# Patient Record
Sex: FEMALE | Race: WHITE | NOT HISPANIC OR LATINO | Employment: OTHER | URBAN - METROPOLITAN AREA
[De-identification: names, ages, dates, MRNs, and addresses within clinical notes are randomized per-mention and may not be internally consistent; named-entity substitution may affect disease eponyms.]

---

## 2024-09-19 ENCOUNTER — LAB VISIT (OUTPATIENT)
Dept: LAB | Facility: HOSPITAL | Age: 29
End: 2024-09-19
Attending: NURSE PRACTITIONER
Payer: COMMERCIAL

## 2024-09-19 DIAGNOSIS — N92.6 IRREGULAR MENSES: ICD-10-CM

## 2024-09-19 PROCEDURE — 36415 COLL VENOUS BLD VENIPUNCTURE: CPT | Performed by: NURSE PRACTITIONER

## 2024-09-19 PROCEDURE — 84144 ASSAY OF PROGESTERONE: CPT | Performed by: NURSE PRACTITIONER

## 2024-09-20 LAB — PROGEST SERPL-MCNC: 10.7 NG/ML

## 2025-01-24 PROBLEM — O03.9 COMPLETE ABORTION: Status: ACTIVE | Noted: 2025-01-24

## 2025-01-24 PROBLEM — Z67.91 RH NEGATIVE STATUS DURING PREGNANCY: Chronic | Status: ACTIVE | Noted: 2025-01-24

## 2025-01-24 PROBLEM — O26.899 RH NEGATIVE STATUS DURING PREGNANCY: Chronic | Status: ACTIVE | Noted: 2025-01-24

## 2025-05-05 ENCOUNTER — OFFICE VISIT (OUTPATIENT)
Dept: INTERNAL MEDICINE | Facility: CLINIC | Age: 30
End: 2025-05-05
Payer: COMMERCIAL

## 2025-05-05 VITALS
HEART RATE: 89 BPM | SYSTOLIC BLOOD PRESSURE: 110 MMHG | WEIGHT: 207.44 LBS | OXYGEN SATURATION: 98 % | HEIGHT: 65 IN | BODY MASS INDEX: 34.56 KG/M2 | DIASTOLIC BLOOD PRESSURE: 74 MMHG | RESPIRATION RATE: 18 BRPM

## 2025-05-05 DIAGNOSIS — Z00.00 HEALTHCARE MAINTENANCE: ICD-10-CM

## 2025-05-05 DIAGNOSIS — Z76.89 ENCOUNTER TO ESTABLISH CARE: Primary | ICD-10-CM

## 2025-05-05 DIAGNOSIS — Z34.92 PREGNANT AND NOT YET DELIVERED IN SECOND TRIMESTER: ICD-10-CM

## 2025-05-05 PROBLEM — O03.9 COMPLETE ABORTION: Status: RESOLVED | Noted: 2025-01-24 | Resolved: 2025-05-05

## 2025-05-05 PROCEDURE — 99999 PR PBB SHADOW E&M-EST. PATIENT-LVL III: CPT | Mod: PBBFAC,,, | Performed by: INTERNAL MEDICINE

## 2025-05-05 NOTE — PROGRESS NOTES
Subjective:       Patient ID: Shannen Barillas is a 29 y.o. female.    Chief Complaint: Establish Care (paperwork)      HPI:    Patient is new to clinic and presents to establish care.     She is 14/6 weeks pregnant. Follows with Dr. Celis for OB care.  Her OB just recommended she establish with a PCP in case she were to have any issues arise.  She has not seen a PCP PE in several years.    She had labs 2024 personally reviewed interpreted by myself.  GFR greater than 60  LFTs normal  LDL normal  A1c normal  HIV screening completed and normal    She is up-to-date on vaccinations.  Discussed getting flu vaccination and Tdap prior to delivery which can be done with her OB.    She did bring prenatal screening for me to review.  Screening revealed she is a carrier for cystic fibrosis, fragile X syndrome, and polycystic kidney disease, autosomal recessive.  She reports her  has already been for genetic screening and is negative for these disease this.  Cystic fibrosis does run on her father side with a paternal cousin  from the disease.  We discussed future implications when her baby is ready to start a family for screening but otherwise no acute issues for this pregnancy.      Past Medical History:   Diagnosis Date    Miscarriage 2025       Family History   Problem Relation Name Age of Onset    Thyroid disease Mother      No Known Problems Father      No Known Problems Sister      No Known Problems Brother      Cystic fibrosis Paternal Cousin         Social History[1]    Review of Systems   Constitutional:  Negative for activity change, fatigue, fever and unexpected weight change.   HENT:  Negative for congestion, ear pain, hearing loss, rhinorrhea and sore throat.    Eyes:  Negative for redness and visual disturbance.   Respiratory:  Negative for cough, shortness of breath and wheezing.    Cardiovascular:  Negative for chest pain, palpitations and leg swelling.   Gastrointestinal:  Negative  for abdominal pain, constipation, diarrhea, nausea and vomiting.   Genitourinary:  Negative for dysuria, frequency and pelvic pain.   Musculoskeletal:  Negative for back pain, joint swelling and neck pain.   Skin:  Negative for color change, rash and wound.   Neurological:  Negative for dizziness, light-headedness and headaches.         Objective:      Physical Exam  Vitals reviewed.   Constitutional:       General: She is not in acute distress.     Appearance: She is well-developed.   HENT:      Head: Normocephalic and atraumatic.      Right Ear: External ear normal.      Left Ear: External ear normal.      Nose: Nose normal.   Eyes:      General:         Right eye: No discharge.         Left eye: No discharge.      Conjunctiva/sclera: Conjunctivae normal.   Neck:      Thyroid: No thyromegaly.   Cardiovascular:      Rate and Rhythm: Normal rate and regular rhythm.      Heart sounds: No murmur heard.  Pulmonary:      Effort: Pulmonary effort is normal. No respiratory distress.      Breath sounds: Normal breath sounds. No wheezing.   Abdominal:      General: There is no distension.      Palpations: Abdomen is soft.      Tenderness: There is no abdominal tenderness.   Skin:     General: Skin is warm and dry.   Neurological:      Mental Status: She is alert and oriented to person, place, and time.   Psychiatric:         Behavior: Behavior normal.         Thought Content: Thought content normal.         Assessment:       1. Encounter to establish care    2. Pregnant and not yet delivered in second trimester    3. Healthcare maintenance        Plan:       1. Encounter to establish care  Medication reconcile  Problem list reviewed and updated  Past medical, surgical, family, and social history reviewed with the patient    2. Pregnant and not yet delivered in second trimester  Follows with outside Ob who is managing her care  Confirms she is taking prenatal daily  Thus far uneventful pregnancy and no acute complaints    3.  Healthcare maintenance  She is up-to-date on all routine screening labs including glucose and cholesterol screening  Discuss receiving flu vaccination and Tdap later in her pregnancy which can be done with her OB.  Voiced understanding       Return to clinic 1 year for routine and PRN               [1]   Social History  Socioeconomic History    Marital status:    Tobacco Use    Smoking status: Never    Smokeless tobacco: Never   Substance and Sexual Activity    Alcohol use: Never    Drug use: Never    Sexual activity: Yes     Partners: Male     Social Drivers of Health     Financial Resource Strain: Low Risk  (5/2/2025)    Overall Financial Resource Strain (CARDIA)     Difficulty of Paying Living Expenses: Not hard at all   Food Insecurity: No Food Insecurity (5/2/2025)    Hunger Vital Sign     Worried About Running Out of Food in the Last Year: Never true     Ran Out of Food in the Last Year: Never true   Transportation Needs: No Transportation Needs (5/2/2025)    PRAPARE - Transportation     Lack of Transportation (Medical): No     Lack of Transportation (Non-Medical): No   Physical Activity: Insufficiently Active (5/2/2025)    Exercise Vital Sign     Days of Exercise per Week: 3 days     Minutes of Exercise per Session: 30 min   Stress: Stress Concern Present (5/2/2025)    Citizen of the [place] [employer] Occupational [employer] - Occupational Stress Questionnaire     Feeling of Stress : To some extent   Housing Stability: Low Risk  (5/2/2025)    Housing Stability Vital Sign     Unable to Pay for Housing in the Last Year: No     Number of Times Moved in the Last Year: 0     Homeless in the Last Year: No